# Patient Record
Sex: FEMALE | Race: WHITE | NOT HISPANIC OR LATINO | Employment: UNEMPLOYED | URBAN - METROPOLITAN AREA
[De-identification: names, ages, dates, MRNs, and addresses within clinical notes are randomized per-mention and may not be internally consistent; named-entity substitution may affect disease eponyms.]

---

## 2019-08-20 ENCOUNTER — HOSPITAL ENCOUNTER (OUTPATIENT)
Dept: URGENT CARE | Facility: CLINIC | Age: 34
Discharge: HOME | End: 2019-08-20
Attending: FAMILY MEDICINE

## 2019-08-22 LAB — BACTERIA SPEC AEROBE CULT: NORMAL

## 2022-08-26 ENCOUNTER — OFFICE VISIT (OUTPATIENT)
Dept: FAMILY MEDICINE CLINIC | Facility: CLINIC | Age: 37
End: 2022-08-26

## 2022-08-26 ENCOUNTER — TELEPHONE (OUTPATIENT)
Dept: FAMILY MEDICINE CLINIC | Facility: CLINIC | Age: 37
End: 2022-08-26

## 2022-08-26 VITALS
SYSTOLIC BLOOD PRESSURE: 136 MMHG | OXYGEN SATURATION: 96 % | WEIGHT: 184.4 LBS | TEMPERATURE: 99.3 F | DIASTOLIC BLOOD PRESSURE: 91 MMHG | BODY MASS INDEX: 31.48 KG/M2 | RESPIRATION RATE: 16 BRPM | HEIGHT: 64 IN | HEART RATE: 84 BPM

## 2022-08-26 DIAGNOSIS — R00.2 PALPITATIONS: Primary | ICD-10-CM

## 2022-08-26 DIAGNOSIS — R60.9 PERIPHERAL EDEMA: ICD-10-CM

## 2022-08-26 PROBLEM — M53.3 SACRAL PAIN: Status: ACTIVE | Noted: 2017-04-28

## 2022-08-26 PROBLEM — G43.909 MIGRAINE: Status: ACTIVE | Noted: 2022-08-26

## 2022-08-26 PROBLEM — M54.30 SCIATICA: Status: ACTIVE | Noted: 2017-04-28

## 2022-08-26 PROBLEM — R20.2 LEG PARESTHESIA: Status: ACTIVE | Noted: 2017-04-28

## 2022-08-26 PROCEDURE — 99203 OFFICE O/P NEW LOW 30 MIN: CPT | Performed by: NURSE PRACTITIONER

## 2022-08-26 RX ORDER — IBUPROFEN 800 MG/1
TABLET ORAL
COMMUNITY
End: 2022-08-26

## 2022-08-26 RX ORDER — GABAPENTIN 300 MG/1
CAPSULE ORAL
COMMUNITY
End: 2022-08-26

## 2022-08-26 RX ORDER — FUROSEMIDE 20 MG/1
20 TABLET ORAL DAILY
Qty: 2 TABLET | Refills: 0 | Status: SHIPPED | OUTPATIENT
Start: 2022-08-26 | End: 2022-08-28

## 2022-08-26 RX ORDER — TOPIRAMATE 25 MG/1
TABLET ORAL
COMMUNITY
End: 2022-08-26

## 2022-08-26 NOTE — PROGRESS NOTES
"Chief Complaint  Leg Swelling and Edema (Eyes and hands. Started after D&C on Aug 4)    Zander Bhatt presents to [facility]  History of Present Illness    Patient had a D & C done on 8/4/22 by Dr. Oseguera.  After that procedure she has had swelling in her legs, ankles, feet, hands, as well as her face.  Blood work done at last OB appointment with Dr. Oseguera on Monday to check for any electrolyte imbalance that could be causing edema.  Denies redness and pain in legs.  Denies chest pain.  States that she has a wheezy cough for the last few days.  She is a smoker.  Had COVID and pneumonia in April and May.  Patient states she has had palpitations off and on for years.  She has gone to the ER once due to palpitations but EKG was normal.  She can feel the palpitations when they happen.  She occasionally gets dizzy when she feels the palpitations.     Objective   Vital Signs:  /91 (BP Location: Right arm, Patient Position: Sitting, Cuff Size: Adult)   Pulse 84   Temp 99.3 °F (37.4 °C) (Infrared)   Resp 16   Ht 162.6 cm (64\")   Wt 83.6 kg (184 lb 6.4 oz)   SpO2 96%   BMI 31.65 kg/m²   Estimated body mass index is 31.65 kg/m² as calculated from the following:    Height as of this encounter: 162.6 cm (64\").    Weight as of this encounter: 83.6 kg (184 lb 6.4 oz).          Physical Exam  Vitals and nursing note reviewed.   Constitutional:       Appearance: Normal appearance.   HENT:      Head: Normocephalic.      Nose: Nose normal.      Mouth/Throat:      Mouth: Mucous membranes are moist.   Eyes:      Extraocular Movements: Extraocular movements intact.      Pupils: Pupils are equal, round, and reactive to light.      Comments: Trace periorbital edema    Cardiovascular:      Rate and Rhythm: Normal rate. Rhythm irregular.      Pulses: Normal pulses.           Dorsalis pedis pulses are 2+ on the right side and 2+ on the left side.        Posterior tibial pulses " are 2+ on the right side and 2+ on the left side.   Pulmonary:      Effort: Pulmonary effort is normal.      Breath sounds: Examination of the left-upper field reveals wheezing. Wheezing present.   Abdominal:      General: Bowel sounds are normal.   Musculoskeletal:         General: Normal range of motion.      Cervical back: Normal range of motion.      Right lower le+ Pitting Edema present.      Left lower le+ Pitting Edema present.   Skin:     General: Skin is warm and dry.   Neurological:      General: No focal deficit present.      Mental Status: She is alert and oriented to person, place, and time.   Psychiatric:         Mood and Affect: Mood normal.         Behavior: Behavior normal.        Result Review :                Assessment and Plan   Diagnoses and all orders for this visit:    1. Palpitations (Primary)  -     Ambulatory Referral to Cardiology  -     Holter Monitor - 72 Hour Up To 15 Days; Future    2. Peripheral edema  -     furosemide (Lasix) 20 MG tablet; Take 1 tablet by mouth Daily for 2 days.  Dispense: 2 tablet; Refill: 0    Patient instructed that if her symptoms worsen or she experiences chest pain, shortness of breath, racing pulse, redness/pain/worsening swelling in legs, she should go to the nearest ER for further evaluation.           Follow Up   Return in about 1 week (around 2022) for Recheck.  Patient was given instructions and counseling regarding her condition or for health maintenance advice. Please see specific information pulled into the AVS if appropriate.        Universal Safety Interventions

## 2022-08-26 NOTE — TELEPHONE ENCOUNTER
Caller: Gemma Bhatt    Relationship to patient: Self    Best call back number: [phone]    Patient is needing: PATIENT WAS RETURNING A CALL. I WAS UNABLE TO TRANSFER HER.

## 2022-08-29 ENCOUNTER — OFFICE VISIT (OUTPATIENT)
Dept: CARDIOLOGY | Facility: CLINIC | Age: 37
End: 2022-08-29

## 2022-08-29 VITALS
DIASTOLIC BLOOD PRESSURE: 102 MMHG | OXYGEN SATURATION: 100 % | HEART RATE: 88 BPM | WEIGHT: 181 LBS | SYSTOLIC BLOOD PRESSURE: 134 MMHG | BODY MASS INDEX: 30.9 KG/M2 | HEIGHT: 64 IN

## 2022-08-29 DIAGNOSIS — R60.9 SWELLING: ICD-10-CM

## 2022-08-29 DIAGNOSIS — Z82.49 FAMILY HISTORY OF PREMATURE CAD: ICD-10-CM

## 2022-08-29 DIAGNOSIS — I10 PRIMARY HYPERTENSION: ICD-10-CM

## 2022-08-29 DIAGNOSIS — R06.02 SHORTNESS OF BREATH: ICD-10-CM

## 2022-08-29 DIAGNOSIS — R00.2 PALPITATIONS: Primary | ICD-10-CM

## 2022-08-29 DIAGNOSIS — E66.09 CLASS 1 OBESITY DUE TO EXCESS CALORIES WITH SERIOUS COMORBIDITY AND BODY MASS INDEX (BMI) OF 31.0 TO 31.9 IN ADULT: ICD-10-CM

## 2022-08-29 PROCEDURE — 93000 ELECTROCARDIOGRAM COMPLETE: CPT | Performed by: NURSE PRACTITIONER

## 2022-08-29 PROCEDURE — 99215 OFFICE O/P EST HI 40 MIN: CPT | Performed by: NURSE PRACTITIONER

## 2022-08-29 RX ORDER — NORETHINDRONE ACETATE AND ETHINYL ESTRADIOL 1MG-20(21)
1 KIT ORAL DAILY
COMMUNITY
Start: 2022-08-22

## 2022-08-29 RX ORDER — SPIRONOLACTONE 25 MG/1
25 TABLET ORAL DAILY
Qty: 30 TABLET | Refills: 11 | Status: SHIPPED | OUTPATIENT
Start: 2022-08-29 | End: 2023-08-29

## 2022-08-29 NOTE — ASSESSMENT & PLAN NOTE
Check lipid panel. If elevated, will recommend statin, etc especially given strong family history of premature CAD. Encouraged risk factor modification. Check stress echocardiogram given shortness of breath and palpitations. Will add low dose aspirin and proceed with heart catheterization if any ischemia.

## 2022-08-29 NOTE — ASSESSMENT & PLAN NOTE
Possibly related to recent miscarriage, COVID-19 infection, salt intake, and/or obesity. Goal BP less than 130/80. Add spironolactone. Check BMP in 1-2 weeks. Advised on importance of two methods of birth control and risk of birth defects. Advised her to decrease salt intake to 1,500 mg per day.

## 2022-08-29 NOTE — PROGRESS NOTES
"Encounter Date:2022  Chief Complaint:   Subjective    Gemma Bhatt is a 37 y.o. female who presents to [facility] today for cardiac evaluation for palpitations at the request of Ileana Gross NP with [facility] [place] [place] [facility]. She is accompanied by her . She underwent a D&C after having a miscarriage at 3 months gestation. She started an oral contraceptive yesterday. She had COVID-19 and may have been treated with Paxlovid. She was not vaccinated.      History of Present Illness   HPI     Palpitations      Additional comments: Has had for years, unchanged. Notices more at night. No associated SOA or dizziness. Tries to cough but no improvement. Usually for seconds. Happens \"a lot\".              PERIPHERAL EDEMA      Additional comments: Face, hands, ankles, feet, and legs. Started about a month ago after having a D&C. Didn't resolve - better after two doses of furosemide but didn't resolve and starting to worsen again.              Dizziness      Additional comments: Started after having D&C              NEW PATIENT      Additional comments: REFERRED BY SKYLAR REESE [facility]/ ORDERED HOLTER MONITOR              family hx of heart issues      Additional comments: Brother age 35 - CABG, CHF - has ICD, mother onset age 37 multiple stents heart and legs, brother non-smoker, mother smoking, paternal uncle pacemaker.               Hypertension      Additional comments: After D&C. Having more frequent headaches. Loves salt - adds when cooking and at the table. Doesn't eat a lot of processed foods.          Last edited by Karolyn Roche APRN on 2022  2:02 PM. (History)        Past Medical History:   Diagnosis Date   • Anxiety    • COVID-19 2022    mild, treated with a \"pill pack\" by [facility] ER   • Hypertension 2022   • Obesity    • Palpitations 2015    approximately     Past Surgical History:   Procedure Laterality Date   •  SECTION     • D & C AND " "LAPAROSCOPY     • SEPTOPLASTY N/A    • SINUS SURGERY       Family History   Problem Relation Age of Onset   • Hyperlipidemia Mother    • Hypertension Mother    • Heart disease Mother    • Lung cancer Mother    • Cervical cancer Mother    • Lumbar disc disease Father    • Arrhythmia Father         palpitations   • Hypertension Father    • Diabetes Sister    • Heart disease Brother 35        CABG x 3?   • ALS Maternal Grandfather    • Lung cancer Paternal Grandmother      Social History     Socioeconomic History   • Marital status:    Tobacco Use   • Smoking status: Current Every Day Smoker     Packs/day: 1.00     Years: 15.00     Pack years: 15.00     Types: Cigarettes     Start date: 2003   • Smokeless tobacco: Never Used   • Tobacco comment: averaged 1/2 ppd, increased to 1 ppd for the last few years   Vaping Use   • Vaping Use: Never used   Substance and Sexual Activity   • Alcohol use: Yes     Comment: can't remember last drink - 1/2 pint once a month or two   • Drug use: Not Currently     Types: Marijuana     Comment: last use was last year every day from 2005 until 2021   • Sexual activity: Yes     Partners: Male     Birth control/protection: Birth control pill     History: Past medical, surgical, family, and social history reviewed.    Outpatient Medications Marked as Taking for the 8/29/22 encounter (Office Visit) with Karolyn Roche APRN   Medication Sig Dispense Refill   • Blisovi FE 1/20 1-20 MG-MCG per tablet Take 1 tablet by mouth Daily.          Objective     Vital Signs:   BP (!) 134/102 (BP Location: Left arm, Patient Position: Sitting, Cuff Size: Adult)   Pulse 88   Ht 162.6 cm (64.02\")   Wt 82.1 kg (181 lb)   SpO2 100%   BMI 31.05 kg/m²   Wt Readings from Last 3 Encounters:   08/29/22 82.1 kg (181 lb)   08/26/22 83.6 kg (184 lb 6.4 oz)         Vitals reviewed.   Constitutional:       Appearance: Well-developed. Obese.   Eyes:      General: No scleral icterus.  Neck:      Vascular: " Normal carotid pulses. No carotid bruit or JVD.   Pulmonary:      Effort: Pulmonary effort is normal.      Breath sounds: Normal breath sounds.   Cardiovascular:      Normal rate. Occasional ectopic beats. Regular rhythm.      No gallop.   Pulses:     Intact distal pulses.   Edema:     Pretibial: bilateral non-pitting edema of the pretibial area.     Ankle: bilateral trace non-pitting edema of the ankle.     Feet: bilateral trace non-pitting edema of the feet.  Skin:     General: Skin is warm and dry.   Neurological:      Mental Status: Alert and oriented to person, place, and time.   Psychiatric:         Behavior: Behavior is cooperative.         Result Review  Data Reviewed:  The following data was reviewed by: EMERALD Boo on 08/29/2022  Scan on 8/22/2022 by Karolyn Roche APRN: CBC/CMP/[facility]/ 8-   Scan on 5/24/2022 by Karolyn Roche APRN: CBC/CMP/[facility]/ 5-   Scan on 4/7/2022 by Karolyn Roche APRN: CBC/CMP/[facility]/ 4-7-2022   Scan on 5/24/2022 by Karolyn Roche APRN: CXR/ [facility]/ 5-   Scan on 4/7/2022 by Karolyn Roche APRN: CT ABD PEL/ [facility]/ 4-7-2022              ECG 12 Lead    Date/Time: 8/29/2022 5:26 PM  Performed by: Karolyn Roche APRN  Authorized by: Karolyn Roche APRN   Previous ECG: no previous ECG available  Rhythm: sinus rhythm  Rate: normal  BPM: 88    Clinical impression: normal ECG               Assessment and Plan   Problem List Items Addressed This Visit        Cardiac and Vasculature    Hypertension    Current Assessment & Plan     Possibly related to recent miscarriage, COVID-19 infection, salt intake, and/or obesity. Goal BP less than 130/80. Add spironolactone. Check BMP in 1-2 weeks. Advised on importance of two methods of birth control and risk of birth defects. Advised her to decrease salt intake to 1,500 mg per day.          Relevant Medications    spironolactone (ALDACTONE) 25 MG tablet    Other Relevant Orders     Adult Stress Echo W/ Cont or Stress Agent if Necessary Per Protocol    Basic Metabolic Panel    TSH    ECG 12 Lead    Palpitations - Primary    Current Assessment & Plan     Check 7 day extended  Holter with diary and TSH but palpitations reportedly unchanged for at least a few years.         Relevant Orders    Adult Stress Echo W/ Cont or Stress Agent if Necessary Per Protocol    TSH    Holter Monitor - 72 Hour Up To 15 Days    ECG 12 Lead       Endocrine and Metabolic    Obesity    Current Assessment & Plan     Patient's (Body mass index is 31.05 kg/m².) indicates that they are obese (BMI >30) with health conditions that include hypertension . Weight is newly identified. BMI is is above average; BMI management plan is completed. We discussed portion control. Advised her to hold off on increasing activity until stress echocardiogram completed.            Family History    Family history of premature CAD    Current Assessment & Plan     Check lipid panel. If elevated, will recommend statin, etc especially given strong family history of premature CAD. Encouraged risk factor modification. Check stress echocardiogram given shortness of breath and palpitations. Will add low dose aspirin and proceed with heart catheterization if any ischemia.         Relevant Orders    Adult Stress Echo W/ Cont or Stress Agent if Necessary Per Protocol    Lipid Panel       Pulmonary and Pneumonias    Shortness of breath    Current Assessment & Plan     Likely multifactorial from obesity, deconditioning, and COVID-19 but also has strong family history of premature CAD. Check stress echocardiogram. Will check echocardiogram and PFTs if no ischemia. No anemia per 8/22/22 labs at [facility]. Discussed when to go to ER.         Relevant Orders    Adult Stress Echo W/ Cont or Stress Agent if Necessary Per Protocol       Symptoms and Signs    Swelling    Current Assessment & Plan     Most likely related to hormone shifts from recent miscarriage  but given hypertension will add spironolactone and check chemistry in 1-2 weeks. Will check echocardiogram if abnormal systolic function on stress echocardiogram.         Relevant Medications    spironolactone (ALDACTONE) 25 MG tablet        Patient was given instructions and counseling regarding her condition or for health maintenance advice. Please see specific information pulled into the AVS if appropriate.    Follow Up :   Return in about 2 months (around 10/29/2022) for Recheck.       Time Spent: I spent 55 minutes caring for Gemma on this date of service. This time includes time spent by me in the following activities: reviewing tests, performing a medically appropriate examination and/or evaluation, counseling and educating the patient/family/caregiver, ordering medications, tests, or procedures and documenting information in the medical record.     I spent 1 minutes on the separately reported service of EKG. This time is not included in the time used to support the E/M service also reported today.        Karolyn Roche, APRN, ACNP-BC, CHFN-BC

## 2022-08-29 NOTE — ASSESSMENT & PLAN NOTE
Check 7 day extended  Holter with diary and TSH but palpitations reportedly unchanged for at least a few years.

## 2022-08-29 NOTE — ASSESSMENT & PLAN NOTE
Patient's (Body mass index is 31.05 kg/m².) indicates that they are obese (BMI >30) with health conditions that include hypertension . Weight is newly identified. BMI is is above average; BMI management plan is completed. We discussed portion control. Advised her to hold off on increasing activity until stress echocardiogram completed.

## 2022-08-29 NOTE — ASSESSMENT & PLAN NOTE
Most likely related to hormone shifts from recent miscarriage but given hypertension will add spironolactone and check chemistry in 1-2 weeks. Will check echocardiogram if abnormal systolic function on stress echocardiogram.

## 2022-08-29 NOTE — ASSESSMENT & PLAN NOTE
Likely multifactorial from obesity, deconditioning, and COVID-19 but also has strong family history of premature CAD. Check stress echocardiogram. Will check echocardiogram and PFTs if no ischemia. No anemia per 8/22/22 labs at [facility]. Discussed when to go to ER.

## 2022-09-01 ENCOUNTER — PATIENT ROUNDING (BHMG ONLY) (OUTPATIENT)
Dept: CARDIOLOGY | Facility: CLINIC | Age: 37
End: 2022-09-01

## 2022-09-01 NOTE — PROGRESS NOTES
September 1, 2022    Hello, may I speak with Gemma Bhatt?    My name is Mahnaz Booht MA        I am  with [facility] [place] [facility]  [place] [address] [phone].    Before we get started may I verify your date of birth? 1985    I am calling to officially welcome you to our practice and ask about your recent visit. Is this a good time to talk? yes    Tell me about your visit with us. What things went well?  Everything went good       We're always looking for ways to make our patients' experiences even better. Do you have recommendations on ways we may improve?  no    Overall were you satisfied with your first visit to our practice? yes       I appreciate you taking the time to speak with me today. Is there anything else I can do for you? no      Thank you, and have a great day.